# Patient Record
Sex: FEMALE | Race: WHITE | Employment: UNEMPLOYED | URBAN - METROPOLITAN AREA
[De-identification: names, ages, dates, MRNs, and addresses within clinical notes are randomized per-mention and may not be internally consistent; named-entity substitution may affect disease eponyms.]

---

## 2017-03-15 ENCOUNTER — OFFICE VISIT (OUTPATIENT)
Dept: ORTHOPEDIC SURGERY | Age: 52
End: 2017-03-15

## 2017-03-15 ENCOUNTER — HOSPITAL ENCOUNTER (OUTPATIENT)
Dept: PHYSICAL THERAPY | Age: 52
Discharge: HOME | End: 2017-03-31
Admitting: ORTHOPAEDIC SURGERY

## 2017-03-15 VITALS — WEIGHT: 164.9 LBS | HEIGHT: 67 IN | BODY MASS INDEX: 25.88 KG/M2

## 2017-03-15 DIAGNOSIS — R90.89 ABNORMAL BRAIN MRI: ICD-10-CM

## 2017-03-15 DIAGNOSIS — M54.12 RADICULITIS OF LEFT CERVICAL REGION: ICD-10-CM

## 2017-03-15 DIAGNOSIS — G44.86 CERVICOGENIC HEADACHE: ICD-10-CM

## 2017-03-15 DIAGNOSIS — M54.2 CERVICAL SPINE PAIN: Primary | ICD-10-CM

## 2017-03-15 DIAGNOSIS — M50.30 DDD (DEGENERATIVE DISC DISEASE), CERVICAL: ICD-10-CM

## 2017-03-15 DIAGNOSIS — M50.20 CERVICAL DISCOGENIC PAIN SYNDROME: Primary | ICD-10-CM

## 2017-03-15 DIAGNOSIS — M54.2 NECK PAIN: ICD-10-CM

## 2017-03-15 DIAGNOSIS — S16.1XXA CERVICAL STRAIN, ACUTE, INITIAL ENCOUNTER: ICD-10-CM

## 2017-03-15 DIAGNOSIS — H81.90 VESTIBULAR DIZZINESS, UNSPECIFIED LATERALITY: ICD-10-CM

## 2017-03-15 DIAGNOSIS — H53.9 VISUAL DISTURBANCE: ICD-10-CM

## 2017-03-15 PROCEDURE — 99204 OFFICE O/P NEW MOD 45 MIN: CPT | Performed by: INTERNAL MEDICINE

## 2017-03-15 PROCEDURE — 72040 X-RAY EXAM NECK SPINE 2-3 VW: CPT | Performed by: INTERNAL MEDICINE

## 2017-03-15 RX ORDER — PREDNISONE 50 MG/1
50 TABLET ORAL DAILY
Qty: 6 TABLET | Refills: 0 | Status: SHIPPED | OUTPATIENT
Start: 2017-03-15 | End: 2017-03-21

## 2017-03-15 RX ORDER — TIZANIDINE 4 MG/1
4 TABLET ORAL 3 TIMES DAILY PRN
Qty: 30 TABLET | Refills: 1 | Status: SHIPPED | OUTPATIENT
Start: 2017-03-15 | End: 2020-10-06 | Stop reason: ALTCHOICE

## 2017-03-15 RX ORDER — ESTRADIOL 0.03 MG/D
1 FILM, EXTENDED RELEASE TRANSDERMAL
COMMUNITY
End: 2020-10-06 | Stop reason: ALTCHOICE

## 2017-03-15 RX ORDER — ONDANSETRON 4 MG/1
4 TABLET, FILM COATED ORAL EVERY 8 HOURS PRN
Qty: 20 TABLET | Refills: 1 | Status: SHIPPED | OUTPATIENT
Start: 2017-03-15 | End: 2020-10-06

## 2017-06-01 ENCOUNTER — OFFICE VISIT (OUTPATIENT)
Dept: ORTHOPEDIC SURGERY | Age: 52
End: 2017-06-01

## 2017-06-01 VITALS — WEIGHT: 165 LBS | HEIGHT: 67 IN | BODY MASS INDEX: 25.9 KG/M2

## 2017-06-01 DIAGNOSIS — M79.671 RIGHT FOOT PAIN: Primary | ICD-10-CM

## 2017-06-01 PROCEDURE — 99999 XR FOOT RIGHT STANDARD: CPT | Performed by: ORTHOPAEDIC SURGERY

## 2017-06-01 RX ORDER — AZITHROMYCIN 500 MG/1
TABLET, FILM COATED ORAL
COMMUNITY
Start: 2017-03-29 | End: 2017-07-03 | Stop reason: ALTCHOICE

## 2017-06-01 RX ORDER — INDOMETHACIN 50 MG/1
CAPSULE ORAL
COMMUNITY
Start: 2017-04-12 | End: 2020-10-06 | Stop reason: ALTCHOICE

## 2017-06-01 RX ORDER — MELOXICAM 15 MG/1
15 TABLET ORAL DAILY
Qty: 30 TABLET | Refills: 3 | Status: SHIPPED | OUTPATIENT
Start: 2017-06-01 | End: 2017-07-03 | Stop reason: ALTCHOICE

## 2017-06-01 RX ORDER — ZOLPIDEM TARTRATE 10 MG/1
TABLET, FILM COATED ORAL
COMMUNITY
Start: 2017-04-27

## 2017-06-01 RX ORDER — DEXTROAMPHETAMINE SACCHARATE, AMPHETAMINE ASPARTATE, DEXTROAMPHETAMINE SULFATE AND AMPHETAMINE SULFATE 1.25; 1.25; 1.25; 1.25 MG/1; MG/1; MG/1; MG/1
TABLET ORAL
COMMUNITY
Start: 2017-04-27 | End: 2020-10-06 | Stop reason: ALTCHOICE

## 2017-06-01 RX ORDER — ESTRADIOL 0.07 MG/D
FILM, EXTENDED RELEASE TRANSDERMAL
COMMUNITY
Start: 2017-05-16 | End: 2020-10-06 | Stop reason: ALTCHOICE

## 2017-06-01 RX ORDER — LORCASERIN HYDROCHLORIDE HEMIHYDRATE 10 MG/1
TABLET ORAL
COMMUNITY
Start: 2017-04-27 | End: 2020-10-06 | Stop reason: ALTCHOICE

## 2017-06-01 RX ORDER — LINACLOTIDE 290 UG/1
CAPSULE, GELATIN COATED ORAL
COMMUNITY
Start: 2017-05-17 | End: 2020-10-06

## 2017-06-01 RX ORDER — PREDNISONE 50 MG/1
TABLET ORAL
COMMUNITY
Start: 2017-03-15 | End: 2020-10-06 | Stop reason: ALTCHOICE

## 2017-06-01 RX ORDER — FLIBANSERIN 100 MG/1
TABLET, FILM COATED ORAL
COMMUNITY
Start: 2017-02-26 | End: 2017-07-03 | Stop reason: ALTCHOICE

## 2017-07-03 ENCOUNTER — OFFICE VISIT (OUTPATIENT)
Dept: DERMATOLOGY | Age: 52
End: 2017-07-03

## 2017-07-03 DIAGNOSIS — D22.9 MULTIPLE NEVI: ICD-10-CM

## 2017-07-03 DIAGNOSIS — L70.0 ACNE VULGARIS: ICD-10-CM

## 2017-07-03 DIAGNOSIS — Z87.2 HISTORY OF ACTINIC KERATOSES: Primary | ICD-10-CM

## 2017-07-03 DIAGNOSIS — D23.9 DERMATOFIBROMA: ICD-10-CM

## 2017-07-03 PROCEDURE — 99214 OFFICE O/P EST MOD 30 MIN: CPT | Performed by: DERMATOLOGY

## 2017-07-03 RX ORDER — CLINDAMYCIN PHOSPHATE 10 MG/ML
SOLUTION TOPICAL
Qty: 60 EACH | Refills: 4 | Status: SHIPPED | OUTPATIENT
Start: 2017-07-03 | End: 2020-10-06 | Stop reason: ALTCHOICE

## 2017-09-06 ENCOUNTER — HOSPITAL ENCOUNTER (OUTPATIENT)
Dept: MAMMOGRAPHY | Age: 52
Discharge: HOME | End: 2017-09-06
Attending: INTERNAL MEDICINE | Admitting: INTERNAL MEDICINE

## 2017-09-06 DIAGNOSIS — Z12.31 VISIT FOR SCREENING MAMMOGRAM: ICD-10-CM

## 2017-10-30 ENCOUNTER — TELEPHONE (OUTPATIENT)
Dept: DERMATOLOGY | Age: 52
End: 2017-10-30

## 2017-10-30 NOTE — TELEPHONE ENCOUNTER
Debra Li called about her son who has not seen Dr. Yari Rodriguez but was recommended by her to see a doctor in [facility]. Debra Li is wondering if Dr. Yari Rodriguez remembers who that doctor was? It was a male doctor in the [facility] area.

## 2018-09-07 ENCOUNTER — TELEPHONE (OUTPATIENT)
Dept: DERMATOLOGY | Age: 53
End: 2018-09-07

## 2018-09-10 NOTE — TELEPHONE ENCOUNTER
Where did she have the biopsy done? Location on her body and doctor who did it? What was there recommendation? Let's find out these details and then see if I'm the right person to see or if I need to refer her to someone else.

## 2018-09-10 NOTE — TELEPHONE ENCOUNTER
Pt lmvm  Pt c/b [phone]   Pt states:   - calling about getting her daughter myron  Please call back to discuss thanks

## 2018-10-25 ENCOUNTER — TELEPHONE (OUTPATIENT)
Dept: DERMATOLOGY | Age: 53
End: 2018-10-25

## 2018-10-29 ENCOUNTER — HOSPITAL ENCOUNTER (OUTPATIENT)
Dept: MAMMOGRAPHY | Age: 53
Discharge: HOME | End: 2018-10-29
Payer: COMMERCIAL

## 2018-10-29 DIAGNOSIS — Z12.31 VISIT FOR SCREENING MAMMOGRAM: ICD-10-CM

## 2018-10-29 PROCEDURE — 77063 BREAST TOMOSYNTHESIS BI: CPT

## 2018-11-05 ENCOUNTER — TELEPHONE (OUTPATIENT)
Dept: DERMATOLOGY | Age: 53
End: 2018-11-05

## 2019-02-25 ENCOUNTER — OFFICE VISIT (OUTPATIENT)
Dept: DERMATOLOGY | Age: 54
End: 2019-02-25
Payer: COMMERCIAL

## 2019-02-25 DIAGNOSIS — D23.9 DERMATOFIBROMA: ICD-10-CM

## 2019-02-25 DIAGNOSIS — D22.9 MULTIPLE NEVI: ICD-10-CM

## 2019-02-25 DIAGNOSIS — Z87.2 HISTORY OF ACTINIC KERATOSES: Primary | ICD-10-CM

## 2019-02-25 DIAGNOSIS — D48.5 NEOPLASM OF UNCERTAIN BEHAVIOR OF SKIN: ICD-10-CM

## 2019-02-25 DIAGNOSIS — L70.0 ACNE VULGARIS: ICD-10-CM

## 2019-02-25 PROCEDURE — 99214 OFFICE O/P EST MOD 30 MIN: CPT | Performed by: DERMATOLOGY

## 2019-04-01 ENCOUNTER — OFFICE VISIT (OUTPATIENT)
Dept: DERMATOLOGY | Age: 54
End: 2019-04-01
Payer: COMMERCIAL

## 2019-04-01 DIAGNOSIS — R52 PAIN: ICD-10-CM

## 2019-04-01 DIAGNOSIS — D23.9 DERMATOFIBROMA: Primary | ICD-10-CM

## 2019-04-01 PROCEDURE — 11400 EXC TR-EXT B9+MARG 0.5 CM<: CPT | Performed by: DERMATOLOGY

## 2019-04-01 PROCEDURE — 99999 PR OFFICE/OUTPT VISIT,PROCEDURE ONLY: CPT | Performed by: DERMATOLOGY

## 2019-04-01 NOTE — PROGRESS NOTES
[facility]  Jt Begum MD  [phone]      Delon  1965    46 y.o. female     Date of Visit: 4/1/2019    Chief Complaint: lesions - here for bx  Chief Complaint   Patient presents with    Skin Lesion     Last seen: 2-2019  *[place] [facility] [place]    History of Present Illness:    1. Here for removal of a persistent papule on the L arm. Is more bothersome/occasionally tender in recent mos. 2. She had a concerning pink lesion on the R thigh noted 2 mos ago - has faded since last seen. Asx. She has had botox and  the past.    Other Hx: HA's x several years. Her HA's resolved completely when she received botox for cosmetic reasons in the past. She reports that she has previously tried imitrex and OTC pain relievers for her HA's in the past without sustained improvement. Dermatology History   Rosacea  has had VI Peels, botox    Several year hx of waxing/waning pruritic eruption involving shoulders/UE and buttocks in the past; diagnosed with gluten sensitivity and has been following gluten-free diet (though occasionally goes off diet); no flares with sun exposure; washes with cetaphil; uses la juana on face and variable moisturizers on remainder of skin. Pt. has been using ice applied to the affected areas to calm the itching. She had a bx done in the past by Dr. Tereasa Crigler bx'd in the past and reports that Dr. Tereasa Crigler was concerned about lupus but further w/u was negative. Has not recurred since following gluten-free. Review of Systems:  Gen: Feels well, good sense of health. Skin: no other concerning moles or concerning lesions    Past Medical History, Family History, Surgical History, Medications and Allergies reviewed.     Past Medical History:   Diagnosis Date    Acne     Anxiety     Constipation     Gluten intolerance     Hearing loss     Hormone replacement therapy     Insomnia     can't confirm re sleep apnea    Menopausal pink and barely visible macule    Assessment and Plan     1. DF on the L upper arm - irritated  - excision to subcut performed with 5 mm instrument after verbal consent obtained. Patient educated regarding risk of bleeding, infection, scar and educated on wound care. Skin cleansed with alcohol pad and site anesthetized with lido + epi. Aluminum chloride applied to site for hemostasis if necessary and sutures placed. Petrolatum ointment and bandage applied. Specimen bottle labeled with patient information and site and specimen sent to dermpath. 2. R thigh - c/w LK - faded and nearly resolved since last seen    F/u 12 mos for FSE    She previously was planning to f/u with Dr. Roxane Do for filler - she wanted her lips treated and wanted a nerve block but hasn't had anything done recently.

## 2019-04-03 LAB — DERMATOLOGY PATHOLOGY REPORT: NORMAL

## 2019-04-04 ENCOUNTER — OFFICE VISIT (OUTPATIENT)
Dept: ORTHOPEDIC SURGERY | Age: 54
End: 2019-04-04
Payer: COMMERCIAL

## 2019-04-04 VITALS — WEIGHT: 165 LBS | HEIGHT: 67 IN | BODY MASS INDEX: 25.9 KG/M2

## 2019-04-04 DIAGNOSIS — M25.551 RIGHT HIP PAIN: Primary | ICD-10-CM

## 2019-04-04 PROCEDURE — 20610 DRAIN/INJ JOINT/BURSA W/O US: CPT | Performed by: ORTHOPAEDIC SURGERY

## 2019-04-04 PROCEDURE — 99999 PR OFFICE/OUTPT VISIT,PROCEDURE ONLY: CPT | Performed by: ORTHOPAEDIC SURGERY

## 2019-04-04 NOTE — PROGRESS NOTES
History of Present Illness:  Enid Aguillon is a 46 y.o. female being seen today for the 1st time for right lateral thigh pain and lumbar pain she gets some radiculopathy to mid thigh but most of it is low back and gluteal    Chief complaint that brought the patient in the office today: Right hip pain and low back pain      Location right hip and low back  Severity severe  Duration several weeks  Associated sign/symptoms pain, swelling, loss of motion, trouble sleeping,    I have reviewed and discussed the below Pain assessment findings with the patient. Pain Assessment  Location of Pain: Pelvis  Location Modifiers: Right  Severity of Pain: 6  Quality of Pain: Throbbing, Sharp  Duration of Pain: Persistent  Aggravating Factors: Squatting, Exercise, Walking, Standing, Stairs  Limiting Behavior: Yes  Relieving Factors: Rest  Result of Injury: No  Work-Related Injury: No  Are there other pain locations you wish to document?: No    Medical History  Patient's medications, allergies, past medical, surgical, social and family histories were reviewed and updated as appropriate. Past Medical History:   Diagnosis Date    Acne     Anxiety     Constipation     Gluten intolerance     Hearing loss     Hormone replacement therapy     Insomnia     can't confirm re sleep apnea    Menopausal syndrome on hormone replacement therapy     Migraine headache     Nausea & vomiting     itching    Ovarian cyst, right     Overweight (BMI 25.0-29. 9)     Pes cavus     Piriformis syndrome     Pruritus     Thyroid disease     nodule    Vitamin D deficiency     started replacement     Family History   Problem Relation Age of Onset    Cancer Mother         lung    Arthritis Mother     Stroke Father     COPD Father      Social History     Socioeconomic History    Marital status:      Spouse name: None    Number of children: None    Years of education: None    Highest education level: None   Occupational History    None   Social Needs    Financial resource strain: None    Food insecurity:     Worry: None     Inability: None    Transportation needs:     Medical: None     Non-medical: None   Tobacco Use    Smoking status: Former Smoker     Last attempt to quit: 1/15/2011     Years since quittin.2    Smokeless tobacco: Never Used   Substance and Sexual Activity    Alcohol use: Yes     Comment: 4 glasses of wine a week    Drug use: No    Sexual activity: None   Lifestyle    Physical activity:     Days per week: None     Minutes per session: None    Stress: None   Relationships    Social connections:     Talks on phone: None     Gets together: None     Attends [employer] service: None     Active member of club or organization: None     Attends meetings of clubs or organizations: None     Relationship status: None    Intimate partner violence:     Fear of current or ex partner: None     Emotionally abused: None     Physically abused: None     Forced sexual activity: None   Other Topics Concern    None   Social History Narrative    None     Current Outpatient Medications   Medication Sig Dispense Refill    tretinoin (RETIN-A) 0.025 % cream APPLY TOPICALLY TO FACE AT BEDTIME 45 g 3    CLINDAMYCIN PHOSPHATE,TOPICAL, (CLINDACIN ETZ) 1 % SWAB Disp: 1 box or 60 pledgets. Wipe the affected area BID 60 each 4    amphetamine-dextroamphetamine (ADDERALL) 5 MG tablet .       MINIVELLE 0.075 MG/24HR       indomethacin (INDOCIN) 50 MG capsule       LINZESS 290 MCG CAPS capsule       BELVIQ 10 MG TABS       predniSONE (DELTASONE) 50 MG tablet       AMBIEN 10 MG tablet       estradiol 0.025 MG/24HR PTTW Place 1 patch onto the skin Twice a Week      tiZANidine (ZANAFLEX) 4 MG tablet Take 1 tablet by mouth 3 times daily as needed (Muscle tightness/spasm) 30 tablet 1    ondansetron (ZOFRAN) 4 MG tablet Take 1 tablet by mouth every 8 hours as needed for Nausea or Vomiting 20 tablet 1     No current facility-administered medications for this visit. Allergies   Allergen Reactions    Pcn [Penicillins] Hives    Gluten Meal Other (See Comments)     bloating       REVIEW OF SYSTEMS:   No rashes today  No new numbness  No tingling  No fevers  No depression  No new onset of pain        Pertinent items are noted in HPI  Review of systems reviewed from Patient History Form dated on 4/4/19 and available in the patient's chart under the Media tab. Examination:    General Exam:    Vitals: Height 5' 7\" (1.702 m), weight 165 lb (74.8 kg). Constitutional: Patient is adequately groomed with no evidence of malnutrition  Mental Status: The patient is oriented to time, place and person. The patient's mood and affect are appropriate. Lymphatic: The lymphatic examination bilaterally reveals all areas to be without enlargement or induration. Vascular: Examination reveals no swelling or calf tenderness. Peripheral pulses are palpable and 2+. Neurological: The patient has good coordination. There is no weakness or sensory deficit. Skin:    Head/Neck: inspection reveals no rashes, ulcerations or lesions. Trunk:  inspection reveals no rashes, ulcerations or lesions. Right Lower Extremity: inspection reveals no rashes, ulcerations or lesions. Left Lower Extremity: inspection reveals no rashes, ulcerations or lesions.       Lumbar/Sacral Spine Examination  Inspection:  Inspection demonstrates no obvious deformity    Palpation:  Severe low back tightness and pain with severe right greater trochanteric bursitis    Rang of Motion:  Good range of motion of her hip or range of motion of her lumbar spine    Strength:  Quadricep, hamstring, EHL, hip flexor, internal and external rotation of the hip against resistance, all 5 over 5 equal bilaterally    Special Tests:  Pedal pulses are +2 over 4 capillary refill is brisk sensation is intact negative Homans negative Lázaro negative Clifford's test negative straight leg raise provocative testing of his hip is unremarkable severe pain to palpation at the greater trochanteric area provocative testing of the hip is unremarkable    Skin: There are no rashes, ulcerations or lesions. Gait: Antalgic  +2/4 and equal bilaterally for patella and Achilles      Additional Comments:     Additional Examinations:  Right Upper Extremity:  Examination of the right upper extremity does not show any tenderness, deformity or injury. Range of motion is unremarkable. There is no gross instability. There are no rashes, ulcerations or lesions. Strength and tone are normal.  Left Upper Extremity: Examination of the left upper extremity does not show any tenderness, deformity or injury. Range of motion is unremarkable. There is no gross instability. There are no rashes, ulcerations or lesions. Strength and tone are normal.  Thoracic Spine: Examination of the thoracic spine does not show any tenderness, deformity or injury. Range of motion is unremarkable. There is no gross instability. There are no rashes, ulcerations or lesions. Strength and tone are normal.  Neck: Examination of the neck does not show any tenderness, deformity or injury. Range of motion is unremarkable. There is no gross instability. There are no rashes, ulcerations or lesions.   Strength and tone are normal.      Diagnostic Testing:    Views AP pelvis and AP and lateral lumbar spine  and I independently reviewed these films today in my office,   Body Part no fracture no dislocation no signs of severe osteoarthritis she does have a slight scoliosis of the lumbar spine and she has an plate irregularity with some early arthritic changes at L2-L3 Impression mild early arthritic changes of the lumbar spine          Assessment:  Lumbar radiculitis radiculopathy with right thigh greater trochanteric bursitis    Impression: Same    Office Procedures: Under sterile conditions with alcohol

## 2019-04-11 ENCOUNTER — OFFICE VISIT (OUTPATIENT)
Dept: ORTHOPEDIC SURGERY | Age: 54
End: 2019-04-11

## 2019-04-11 VITALS — BODY MASS INDEX: 25.88 KG/M2 | HEIGHT: 67 IN | WEIGHT: 164.9 LBS

## 2019-04-11 DIAGNOSIS — M54.5 ACUTE RIGHT-SIDED LOW BACK PAIN, WITH SCIATICA PRESENCE UNSPECIFIED: Primary | ICD-10-CM

## 2019-04-11 PROCEDURE — 99024 POSTOP FOLLOW-UP VISIT: CPT | Performed by: ORTHOPAEDIC SURGERY

## 2019-04-11 RX ORDER — MELOXICAM 15 MG/1
15 TABLET ORAL DAILY
Qty: 90 TABLET | Refills: 3 | Status: SHIPPED | OUTPATIENT
Start: 2019-04-11 | End: 2020-10-06 | Stop reason: ALTCHOICE

## 2019-04-11 NOTE — PROGRESS NOTES
Relationships    Social connections:     Talks on phone: None     Gets together: None     Attends [employer] service: None     Active member of club or organization: None     Attends meetings of clubs or organizations: None     Relationship status: None    Intimate partner violence:     Fear of current or ex partner: None     Emotionally abused: None     Physically abused: None     Forced sexual activity: None   Other Topics Concern    None   Social History Narrative    None     Current Outpatient Medications   Medication Sig Dispense Refill    tretinoin (RETIN-A) 0.025 % cream APPLY TOPICALLY TO FACE AT BEDTIME 45 g 3    CLINDAMYCIN PHOSPHATE,TOPICAL, (CLINDACIN ETZ) 1 % SWAB Disp: 1 box or 60 pledgets. Wipe the affected area BID 60 each 4    amphetamine-dextroamphetamine (ADDERALL) 5 MG tablet .  MINIVELLE 0.075 MG/24HR       indomethacin (INDOCIN) 50 MG capsule       LINZESS 290 MCG CAPS capsule       BELVIQ 10 MG TABS       predniSONE (DELTASONE) 50 MG tablet       AMBIEN 10 MG tablet       estradiol 0.025 MG/24HR PTTW Place 1 patch onto the skin Twice a Week      tiZANidine (ZANAFLEX) 4 MG tablet Take 1 tablet by mouth 3 times daily as needed (Muscle tightness/spasm) 30 tablet 1    ondansetron (ZOFRAN) 4 MG tablet Take 1 tablet by mouth every 8 hours as needed for Nausea or Vomiting 20 tablet 1     No current facility-administered medications for this visit. Allergies   Allergen Reactions    Pcn [Penicillins] Hives    Gluten Meal Other (See Comments)     bloating       REVIEW OF SYSTEMS:   Pertinent items are noted in HPI  Review of systems reviewed from Patient History Form dated on 4/11/19 and available in the patient's chart under the Media tab. Examination:    General Exam:    Vitals: Height 5' 7.01\" (1.702 m), weight 164 lb 14.5 oz (74.8 kg).   Constitutional: Patient is adequately groomed with no evidence of malnutrition  Mental Status: The patient is oriented to time, place and person. The patient's mood and affect are appropriate. Lumbar/Sacral Spine Examination  Inspection:  Inspection demonstrates no obvious deformity    Palpation:  Moderate muscle spasm and pain along the lumbar spine    Rang of Motion:  Decreased range of motion of the lumbar spine    Strength:  Quadricep, hamstring, EHL, hip flexor, internal and external rotation of the hip against resistance, all 5 over 5 equal bilaterally    Special Tests:  Pedal pulses are +2 over 4 capillary refill is brisk sensation is intact negative Homans negative Lázaro negative Clifford's test straight-leg raise on the right is positive moderate pain to palpation along the lumbar spine    Gait: Antalgic    Additional Comments:     Additional Examinations:  Right Upper Extremity:  Examination of the right upper extremity does not show any tenderness, deformity or injury. Range of motion is unremarkable. There is no gross instability. There are no rashes, ulcerations or lesions. Strength and tone are normal.  Left Upper Extremity: Examination of the left upper extremity does not show any tenderness, deformity or injury. Range of motion is unremarkable. There is no gross instability. There are no rashes, ulcerations or lesions. Strength and tone are normal.  Thoracic Spine: Examination of the thoracic spine does not show any tenderness, deformity or injury. Range of motion is unremarkable. There is no gross instability. There are no rashes, ulcerations or lesions.   Strength and tone are normal.      Diagnostic Testing:    Views MRI multiple views taken in the office today  Body Part lumbar spine Impression several areas of moderate-to-severe osteoarthritis with joint space narrowing and nerve impingement especially L4-L5 and L5-S1 rotary scoliosis and straightening of the lumbar curvature          Assessment:  Lumbar disc disease with osteoarthritis and radiculopathy    Impression: Same    Office Procedures:  Orders Placed This Encounter   Procedures    Ambulatory referral to Physical Therapy     Referral Priority:   Routine     Referral Type:   Eval and Treat     Referral Reason:   Specialty Services Required     Requested Specialty:   Physical Therapy     Number of Visits Requested:   1       Treatment Plan:  Physical therapy, possible epidural injections, meloxicam, follow-up in a few weeks         Rosalie Gillespie DO

## 2019-04-25 ENCOUNTER — HOSPITAL ENCOUNTER (OUTPATIENT)
Dept: PHYSICAL THERAPY | Age: 54
Setting detail: THERAPIES SERIES
Discharge: HOME | End: 2019-04-25
Payer: COMMERCIAL

## 2019-04-25 PROCEDURE — 97140 MANUAL THERAPY 1/> REGIONS: CPT

## 2019-04-25 PROCEDURE — 97110 THERAPEUTIC EXERCISES: CPT

## 2019-04-25 PROCEDURE — G0283 ELEC STIM OTHER THAN WOUND: HCPCS

## 2019-04-25 PROCEDURE — 97162 PT EVAL MOD COMPLEX 30 MIN: CPT

## 2019-04-25 NOTE — PLAN OF CARE
The [address] [place] [address] and [address]      Physical Therapy Certification    Dear Referring Practitioner: Lance Arevalo,    We had the pleasure of evaluating the following patient for physical therapy services at [address] [place] [address]. A summary of our findings can be found in the initial assessment below. This includes our plan of care. If you have any questions or concerns regarding these findings, please do not hesitate to contact me at the office phone number checked above. Thank you for the referral.       Physician Signature:_______________________________Date:__________________  By signing above (or electronic signature), therapists plan is approved by physician      Patient: Antione Pinedo   : 1965   MRN: [id number]  Referring Physician: Referring Practitioner: Lance Arevalo      Evaluation Date: 2019      Medical Diagnosis Information:  Diagnosis: M54.5 (ICD-10-CM) - Acute right-sided low back pain, with sciatica presence unspecified   Treatment Diagnosis: Decreased lumbar ROM, decreased lumbar and hip flexibility, decreased BLE strength, increased pain                                         Insurance information: PT Insurance Information: PT BENEFITS 2019 FACILITY/[employer]/ EFFECTIVE 16/ ACTIVE/DED 6800  MET 6132.92/ PAYS 100%/ OOP 6800 MET 6132.92/ 25 VPCY/ 0 AUTH/ RAIN REF# [id number] 19 PAG     Precautions/ Contra-indications: OA, Anxiety  Latex Allergy:  [x]NO      []YES  Preferred Language for Healthcare:   [x]English       []other:    SUBJECTIVE: Patient stated complaint: 46year old female presents to PT with complaints of low back pain and lateral right hip pain. She has had back pain for years (denies any ODALYS) that she feels is worsened after busier days if she has been doing physical work. Her back pain was intensified after going on vacation and included pain in her right glute.  She reports that she had recently gained weight so she tried getting back into working out [employer]) and had pain with things like sit ups and bridges. Today, she has pinpoint pain in her right glute and a broad spectrum of pain across her lumbar spine. She describes it as \"uncomfortable\". Denies radiating pain, denies numbness/tingling. Does feel her balance is slightly off. No changes in bowel / bladder function. No changes in sensation. No unusual weight loss. Pain at night due to difficulty \" getting comfortable\".      Relevant Medical History: Dystonia in left foot (she receives injections from neurologist)  Functional Disability Index/G-Codes:  PT G-Codes  Functional Assessment Tool Used: PATIENT DID NOT COMPLETE ON INITIAL EVAL - WILL ADD AT NPV    Pain Scale: 4/10 at rest, 7-8/10 at worst  Easing factors: Floating in water tank, heat, Meloxicam (at night)  Provocative factors: performing sit-up, yard work / gardening,  Lying down (semi reclined) at night    Type: []Constant   []Intermittent  []Radiating []Localized []other:     Numbness/Tingling: Denies    Occupation/School:  Currently building a home so busy with with meetings and packing boxes    Living Status/Prior Level of Function: Independent with ADLs and IADLs; hopes to return to [employer]; [employer] in the winter time    OBJECTIVE:   ROM  Comments   Trunk flexion WNL Reports \"tightness\" and \"sore\"   Trunk extension 50% Limited Painful at L5/S1   Trunk R sidebend To joint line of knee Mild discomfort in right gluteal area   Trunk L sidebend To joint line of knee    Trunk R rotation     Trunk L rotation          HS flexibility WNL bilaterally    Quad Flexibility  Limited L>R    Piriformis Limited R>L    ITB Mild limitation Bilat      Strength Left Right Comments   Hip flexion(L2) 4+ 4+    Knee extension(L3) 5 5    Knee flexion(S1-2) 5 5    Ankle dorsiflexion(L4) 5 5    Toe extension(L5) 5 5    Ankle eversion/plantar flexion(S1) NT NT          Hip Abd sleep   [] Reduced ability or tolerance with driving and/or computer work   [x]Reduced ability to perform lifting, reaching, carrying tasks   [x]Reduced ability to squat   [x]Reduced ability to forward bend   [x]Reduced ability to ambulate prolonged functional periods/distances/surfaces   [x]Reduced ability to ascend/descend stairs   []other:       Participation Restrictions   [x]Reduced participation in self care activities   [x]Reduced participation in home management activities   []Reduced participation in work activities   [x]Reduced participation in social activities. []Reduced participation in sport/recreational activities. Classification:   []Signs/symptoms consistent with Lumbar instability/stabilization subgroup. []Signs/symptoms consistent with Lumbar mobilization/manipulation subgroup, myotomes and dermatomes intact. Meets manipulation criteria. []Signs/symptoms consistent with Lumbar direction specific/centralization subgroup   []Signs/symptoms consistent with Lumbar traction subgroup     []Signs/symptoms consistent with lumbar facet dysfunction   [x]Signs/symptoms consistent with lumbar stenosis type dysfunction   []Signs/symptoms consistent with nerve root involvement including myotome & dermatome dysfunction   []Signs/symptoms consistent with post-surgical status including: decreased ROM, strength and function.    [x]signs/symptoms consistent with pathology which may benefit from Dry needling     []other:      Prognosis/Rehab Potential:      []Excellent   [x]Good    []Fair   []Poor    Tolerance of evaluation/treatment:    []Excellent   [x]Good    []Fair   []Poor    Physical Therapy Evaluation Complexity Justification  [x] A history of present problem with:  [] no personal factors and/or comorbidities that impact the plan of care;  [x]1-2 personal factors and/or comorbidities that impact the plan of care  []3 personal factors and/or comorbidities that impact the plan of care  [x] An for Patient:   Short Term Goals: To be achieved in: 2 weeks  1. Independent in HEP and progression per patient tolerance, in order to prevent re-injury. 2. Patient will have a decrease in pain to facilitate improvement in movement, function, and ADLs as indicated by Functional Deficits. Long Term Goals: To be achieved in: 8 weeks  1. Disability index score of 10% or less for the [place] to assist with reaching prior level of function. 2. Patient will demonstrate increased AROM to WNL, good LS mobility, good hip ROM to allow for proper joint functioning as indicated by patients Functional Deficits. 3. Patient will demonstrate an increase in Strength to good proximal hip and core activation to allow for proper functional mobility as indicated by patients Functional Deficits. 4. Patient will return to pilates classes without increased symptoms or restriction.    5. Patient will be able to tolerate yard work and gardening without an increase in her low back or hip pain. (patient specific functional goal)       Electronically signed by:  Jonny Mcmahon, PT, DPT

## 2019-04-26 NOTE — FLOWSHEET NOTE
eversion/plantar flexion(S1) NT NT               Hip Abd 4- 3+     Hip Ext 4+ 4-        Special tests   Comments   SLR Neg     Slump test Mild BIlaterally     Pelvic symmetry  Neg     Segmental Spinal mobility Mild hypo throughout Tenderness noted by patient throughout lumbar spine   Heel walk       Toe walk       Tandem walk       CODI Neg bilat Slight tightness on R   FADDIR Neg bilat Feels stretch bilaterally              DTRs Left Right Comments   Patellar(L3-L4) NT NT     Achilles(S1-S2) NT NT                  Joint mobility:               []Normal               [x]Hypo: slight restriction in lumbar spine with PA mobs              []Hyper     Palpation: moderate tenderness throughout piriformis and at insertion point on greater trochanter on R side; moderate tenderness in R lumbar paraspinals, mild on L lumbar paraspinals     Functional Mobility/Transfers: Ind     Posture: Slight increased lordosis in lumbar spine at rest     Bandages/Dressings/Incisions: n/a     Gait: (include devices/WB status) mild trendelenburg pattern      Orthopedic Special Tests: see above        RESTRICTIONS/PRECAUTIONS: OA lumbar spine / DDD;  Anxiety    Exercises/Interventions:   ROM/stretches     SKTC NPV    DKTC 3 x 30\"    Prayer stretch     Supine HS     Pelvic tilt     Hook lying rotation 10 x 10\"    Cat and camel     Piriformis 3 x 30\" each Figure 4 - pull        Strengthening     PPT NPV    PPT + TrA NPV              SLR     Quadruped alternate UE reaches     Quadruped alternate LE reaches     Quadruped alternate UE/LE reaches     Humouno heel raises     Sit ups      planks     Tband lat pulls     Tband rows         Manual Intervention             Prone PA      GISTM/STM 8'  Lumbar paraspinals   Lumbar Manip      SI Manip      Hip belt mobs      Hip LA distraction              Therapeutic Exercise and NMR EXR  [x] (83299) Provided verbal/tactile cueing for activities related to strengthening, flexibility, endurance, ROM  for improvements in proximal hip and core control with self care, mobility, lifting and ambulation.  [] (75608) Provided verbal/tactile cueing for activities related to improving balance, coordination, kinesthetic sense, posture, motor skill, proprioception  to assist with core control in self care, mobility, lifting, and ambulation. Therapeutic Activities:    [x] (17496 or 52760) Provided verbal/tactile cueing for activities related to improving balance, coordination, kinesthetic sense, posture, motor skill, proprioception and motor activation to allow for proper function  with self care and ADLs  [] (82747) Provided training and instruction to the patient for proper core and proximal hip recruitment and positioning with ambulation re-education     Home Exercise Program:    [x] (66990) Reviewed/Progressed HEP activities related to strengthening, flexibility, endurance, ROM of core, proximal hip and LE for functional self-care, mobility, lifting and ambulation   [] (25276) Reviewed/Progressed HEP activities related to improving balance, coordination, kinesthetic sense, posture, motor skill, proprioception of core, proximal hip and LE for self care, mobility, lifting, and ambulation      Manual Treatments:  PROM / STM / Oscillations-Mobs:  G-I, II, III, IV (PA's, Inf., Post.)  [x] (48553) Provided manual therapy to mobilize proximal hip and LS spine soft tissue/joints for the purpose of modulating pain, promoting relaxation,  increasing ROM, reducing/eliminating soft tissue swelling/inflammation/restriction, improving soft tissue extensibility and allowing for proper ROM for normal function with self care, mobility, lifting and ambulation.      Modalities:   Hot pack + IFC x 15'    Charges:  Timed Code Treatment Minutes: 25'   Total Treatment Minutes: 72'       [] EVAL (LOW) 97986 (typically 20 minutes face-to-face)  [x] EVAL (MOD) 70513 (typically 30 minutes face-to-face)  [] EVAL (HIGH) 77034 (typically 45 minutes face-to-face)  [] RE-EVAL     [x] WZ(71191) x  1   [] IONTO  [] NMR (47917) x      [] VASO  [x] Manual (53632) x  1    [] Other:  [] TA x       [] Mech Traction (91317)  [] ES(attended) (62714)      [x] ES (un) (61357):     GOALS:  Patient stated goal: Return to pilates and fitness activities    Therapist goals for Patient:   Short Term Goals: To be achieved in: 2 weeks  1. Independent in HEP and progression per patient tolerance, in order to prevent re-injury. 2. Patient will have a decrease in pain to facilitate improvement in movement, function, and ADLs as indicated by Functional Deficits. Long Term Goals: To be achieved in: 8 weeks  1. Disability index score of 10% or less for the José Antoniostan to assist with reaching prior level of function. 2. Patient will demonstrate increased AROM to WNL, good LS mobility, good hip ROM to allow for proper joint functioning as indicated by patients Functional Deficits. 3. Patient will demonstrate an increase in Strength to good proximal hip and core activation to allow for proper functional mobility as indicated by patients Functional Deficits. 4. Patient will return to pilates classes without increased symptoms or restriction. 5. Patient will be able to tolerate yard work and gardening without an increase in her low back or hip pain. (patient specific functional goal)       Progression Towards Functional goals:  [] Patient is progressing as expected towards functional goals listed. [] Progression is slowed due to complexities listed. [] Progression has been slowed due to co-morbidities.   [x] Plan just implemented, too soon to assess goals progression  [] Other:     ASSESSMENT:  See eval    Treatment/Activity Tolerance:  [x] Patient tolerated treatment well [] Patient limited by fatique  [] Patient limited by pain  [] Patient limited by other medical complications  [] Other:     Prognosis: [x] Good [] Fair  [] Poor    Patient Requires Follow-up: [x] Yes  [] No    PLAN: See eval  [] Continue per plan of care [] Alter current plan (see comments)  [x] Plan of care initiated [] Hold pending MD visit [] Discharge    Electronically signed by: Tiffanie Nascimento PT, DPT

## 2019-05-01 ENCOUNTER — HOSPITAL ENCOUNTER (OUTPATIENT)
Dept: PHYSICAL THERAPY | Age: 54
Setting detail: THERAPIES SERIES
Discharge: HOME | End: 2019-05-01
Payer: COMMERCIAL

## 2019-05-01 NOTE — FLOWSHEET NOTE
[facility] [address] [facility] [address]    Physical Therapy  Cancellation/No-show Note  Patient Name:  Phani Bean  :  1965   Date:  2019  Cancelled visits to date: 0  No-shows to date: 1    For today's appointment patient:  []  Cancelled  []  Rescheduled appointment  [x]  No-show     Reason given by patient:  []  Patient ill  []  Conflicting appointment   []  No transportation    []  Conflict with work  [x]  No reason given  []  Other:     Comments:      Electronically signed by:  Alistair Gamez PT, DPT

## 2019-05-03 ENCOUNTER — HOSPITAL ENCOUNTER (OUTPATIENT)
Dept: PHYSICAL THERAPY | Age: 54
Setting detail: THERAPIES SERIES
Discharge: HOME | End: 2019-05-03

## 2019-05-03 NOTE — FLOWSHEET NOTE
[facility] [address] [facility] [address]    Physical Therapy  Cancellation/No-show Note  Patient Name:  Cristi Melchor  :  1965   Date:  5/3/2019  Cancelled visits to date: 0  No-shows to date: 2    For today's appointment patient:  []  Cancelled  []  Rescheduled appointment  [x]  No-show     Reason given by patient:  []  Patient ill  []  Conflicting appointment   []  No transportation    []  Conflict with work  [x]  No reason given  []  Other:     Comments:      Electronically signed by:  Stephanie Benjamin, PT, DPT

## 2019-05-07 ENCOUNTER — HOSPITAL ENCOUNTER (OUTPATIENT)
Dept: PHYSICAL THERAPY | Age: 54
Setting detail: THERAPIES SERIES
Discharge: HOME | End: 2019-05-07

## 2019-05-07 NOTE — FLOWSHEET NOTE
[facility] [address]    Physical Therapy  Cancellation/No-show Note  Patient Name:  Jocy Joseph  :  1965   Date:  2019  Cancelled visits to date: 1  No-shows to date: 2    For today's appointment patient:  [x]  Cancelled  []  Rescheduled appointment  []  No-show     Reason given by patient:  []  Patient ill  [x]  Conflicting appointment   []  No transportation    []  Conflict with work  []  No reason given  []  Other:     Comments:      Electronically signed by:  Michel Manuel PT, DPT

## 2019-10-30 ENCOUNTER — HOSPITAL ENCOUNTER (OUTPATIENT)
Dept: MAMMOGRAPHY | Age: 54
Discharge: HOME | End: 2019-10-30
Payer: COMMERCIAL

## 2019-10-30 DIAGNOSIS — Z12.31 VISIT FOR SCREENING MAMMOGRAM: ICD-10-CM

## 2019-10-30 PROCEDURE — 77063 BREAST TOMOSYNTHESIS BI: CPT

## 2020-06-09 ENCOUNTER — OFFICE VISIT (OUTPATIENT)
Dept: DERMATOLOGY | Age: 55
End: 2020-06-09
Payer: COMMERCIAL

## 2020-06-09 ENCOUNTER — HOSPITAL ENCOUNTER (OUTPATIENT)
Dept: OCCUPATIONAL THERAPY | Age: 55
Setting detail: THERAPIES SERIES
Discharge: HOME | End: 2020-06-09
Payer: COMMERCIAL

## 2020-06-09 VITALS — TEMPERATURE: 97.9 F

## 2020-06-09 PROCEDURE — 99213 OFFICE O/P EST LOW 20 MIN: CPT | Performed by: DERMATOLOGY

## 2020-06-09 NOTE — PROGRESS NOTES
[facility] Dermat[facility]  Maureen Lynch MD  [phone]      Delon  1965    63 y.o. female     Date of Visit: 6/9/2020    Chief Complaint: moles/lesions, f/u acne  Chief Complaint   Patient presents with    Skin Exam     Last seen: 4-2019  *[place] a [place]  *youngest children just graduated from [address] [place] [address]    History of Present Illness:    1. Hx of AKs. Here for f/u and full skin check. No new concerns. 2. Here for evaluation of multiple asx pigmented lesions on the trunk and extremities, present for many years; no change in size/shape/color of any lesions; no bleeding lesions. 3. S/p excision of DF on the L arm in 2019 - no probs since. 4. She has a dilated blood vessel on the L cheek that she would like removed. Asx.     5.  Hx of facial breakouts - she has had perioral derm, acne (hormonal flares) and fx of rosacea. No c/o currently. S/p ILK for nodules in the past.  Using tretinoin. clinda topically in the past as well. Previously using \"radha's choice\" products. She has had botox and  the past.    Other Hx: HA's x several years. Her HA's resolved completely when she received botox for cosmetic reasons in the past. She reports that she has previously tried imitrex and OTC pain relievers for her HA's in the past without sustained improvement. Dermatology History   Rosacea  has had VI Peels, botox    Several year hx of waxing/waning pruritic eruption involving shoulders/UE and buttocks in the past; diagnosed with gluten sensitivity and has been following gluten-free diet (though occasionally goes off diet); no flares with sun exposure; washes with cetaphil; uses la juana on face and variable moisturizers on remainder of skin. Pt. has been using ice applied to the affected areas to calm the itching.    She had a bx done in the past by Dr. Ferdinand Mendoza bx'd in the past and reports that Dr. Ferdinand Mendoza was concerned about lupus but further w/u was

## 2020-06-09 NOTE — FLOWSHEET NOTE
[facility]    Occupational Therapy  Cancellation/No-show Note  Patient Name:  Paula Torres  :  1965   Date:  2020  Cancelled visits to date: 0  No-shows to date:1 for evaluation    For today's appointment patient:  []  Cancelled  []  Rescheduled appointment  [x]  No-show     Reason given by patient:  []  Patient ill  []  Conflicting appointment  []  No transportation    []  Conflict with work  []  No reason given  []  Other:     Comments:      Phone call information:   []  Phone call made today to patient at 8:30 am_ time at number provided:      []  Patient answered, conversation as follows:    [x]  Patient did not answer, message left as follows: Informed pt we had  Her scheduled today at 8 am and to call and let us know if she wishes to reschedule. []  Phone call not made today  []  Phone call not needed - pt contacted us to cancel and provided reason for cancellation. Electronically signed by:   [id number] [facility], [address] [address] [place] [address]

## 2020-10-06 ENCOUNTER — PROCEDURE VISIT (OUTPATIENT)
Dept: DERMATOLOGY | Age: 55
End: 2020-10-06

## 2020-10-06 VITALS — TEMPERATURE: 97.9 F

## 2020-10-06 PROCEDURE — DM01310 VBEAM LASER SMALL OR 2 AREAS: Performed by: DERMATOLOGY

## 2020-10-06 RX ORDER — BUPROPION HYDROCHLORIDE 75 MG/1
75 TABLET ORAL DAILY
COMMUNITY

## 2020-10-06 NOTE — PROGRESS NOTES
[facility] Dermatology  Cheryl Morales MD  [address] [facility]  1965    63 y.o. female     Date of Visit: 10/6/2020    Chief Complaint: telangiectasias  Chief Complaint   Patient presents with    Laser Treatment     V-beam,-Rt cheek    Other     check a small circular spot on Rt upper back. Last seen: 6-2020  *[place] a house [place]  *youngest children just graduated from [employer] [facility] - 2020    History of Present Illness:    Here for trx of a dilated blood vessel on the L cheek that she would like removed. Asx. Few other tiny telangiectasias on the cheeks she would also like treated. She has had botox and  the past.    Other Hx: HA's x several years. Her HA's resolved completely when she received botox for cosmetic reasons in the past. She reports that she has previously tried imitrex and OTC pain relievers for her HA's in the past without sustained improvement. Dermatology History   Rosacea  has had VI Peels, botox    Several year hx of waxing/waning pruritic eruption involving shoulders/UE and buttocks in the past; diagnosed with gluten sensitivity and has been following gluten-free diet (though occasionally goes off diet); no flares with sun exposure; washes with cetaphil; uses la juana on face and variable moisturizers on remainder of skin. Pt. has been using ice applied to the affected areas to calm the itching. She had a bx done in the past by Dr. Phi Bustillo bx'd in the past and reports that Dr. Phi Bustillo was concerned about lupus but further w/u was negative. Has not recurred since following gluten-free. Review of Systems:  Gen: Feels well, good sense of health. Skin: no changing moles or concerning lesions    Past Medical History, Family History, Surgical History, Medications and Allergies reviewed.     Past Medical History:   Diagnosis Date    Acne     Anxiety     Constipation     Gluten intolerance     Hearing loss     Hormone replacement therapy     Insomnia     can't confirm re sleep apnea    Menopausal syndrome on hormone replacement therapy     Migraine headache     Nausea & vomiting     itching    Ovarian cyst, right     Overweight (BMI 25.0-29. 9)     Pes cavus     Piriformis syndrome     Pruritus     Thyroid disease     nodule    Vitamin D deficiency     started replacement       Past Surgical History:   Procedure Laterality Date    ABDOMEN SURGERY N/A 1/17/2014    ROBOTIC ASSISTED LAPAROSCOPIC REMOVAL OF INFLAMMATORY CYST    ANTERIOR CRUCIATE LIGAMENT REPAIR      left    APPENDECTOMY      BREAST SURGERY      augmentation    ELBOW SURGERY      HERNIA REPAIR      umbilical    HYSTERECTOMY      HYSTERECTOMY  2008    INCONTINENCE SURGERY  2008    MYOMECTOMY      OVARIAN CYST REMOVAL      OVARIAN CYST SURGERY      THYROID SURGERY  biopsy       Outpatient Medications Marked as Taking for the 10/6/20 encounter (Procedure visit) with Gerardo Esteves MD   Medication Sig Dispense Refill    buPROPion (WELLBUTRIN) 75 MG tablet Take 75 mg by mouth daily      tretinoin (RETIN-A) 0.025 % cream APPLY EXTERNALLY TO FACE AT BEDTIME 45 g 2    AMBIEN 10 MG tablet          Allergies   Allergen Reactions    Pcn [Penicillins] Hives    Gluten Meal Other (See Comments)     bloating         Physical Examination     Well-appearing    L cheek with most prominent but both cheeks, ala with scattered telangiectasia      Assessment and Plan     1. Facial tlangiectasia - Vbean    Laser Procedure Note       Radha Yeh   YOB: 1965    DATE OF VISIT:  10/6/2020  Chief Complaint   Patient presents with    Laser Treatment     V-beam,-Rt cheek    Other     check a small circular spot on Rt upper back.          LASER: Vbeam  DIAGNOSIS: telangiectasias            We discussed treatment options, including no treatment as well as the following:  - need for multiple treatments and risk of incomplete clearance, recurrence  -

## 2020-10-29 ENCOUNTER — HOSPITAL ENCOUNTER (OUTPATIENT)
Dept: MAMMOGRAPHY | Age: 55
Discharge: HOME | End: 2020-10-29
Payer: COMMERCIAL

## 2020-10-29 PROCEDURE — 77063 BREAST TOMOSYNTHESIS BI: CPT

## 2021-03-04 ENCOUNTER — HOSPITAL ENCOUNTER (OUTPATIENT)
Dept: GENERAL RADIOLOGY | Age: 56
Discharge: HOME | End: 2021-03-04
Payer: COMMERCIAL

## 2021-03-04 DIAGNOSIS — R14.0 ABDOMINAL BLOATING: ICD-10-CM

## 2021-03-04 PROCEDURE — 74248 X-RAY SM INT F-THRU STD: CPT

## 2021-12-06 ENCOUNTER — HOSPITAL ENCOUNTER (OUTPATIENT)
Dept: WOMENS IMAGING | Age: 56
Discharge: HOME | End: 2021-12-06
Payer: COMMERCIAL

## 2021-12-06 DIAGNOSIS — Z12.31 VISIT FOR SCREENING MAMMOGRAM: ICD-10-CM

## 2021-12-06 PROCEDURE — 77063 BREAST TOMOSYNTHESIS BI: CPT

## 2022-10-04 ENCOUNTER — OFFICE VISIT (OUTPATIENT)
Dept: DERMATOLOGY | Age: 57
End: 2022-10-04
Payer: COMMERCIAL

## 2022-10-04 DIAGNOSIS — L57.0 AK (ACTINIC KERATOSIS): Primary | ICD-10-CM

## 2022-10-04 DIAGNOSIS — D22.9 MULTIPLE NEVI: ICD-10-CM

## 2022-10-04 DIAGNOSIS — I78.1 TELANGIECTASIA: ICD-10-CM

## 2022-10-04 DIAGNOSIS — L70.0 ACNE VULGARIS: ICD-10-CM

## 2022-10-04 DIAGNOSIS — L81.4 LENTIGINES: ICD-10-CM

## 2022-10-04 PROCEDURE — 99214 OFFICE O/P EST MOD 30 MIN: CPT | Performed by: DERMATOLOGY

## 2022-10-04 NOTE — PROGRESS NOTES
[facility] Dermat[facility]  Leonie Sheppard MD  [phone]      Delon  1965    61 y.o. female     Date of Visit: 10/4/2022    Chief Complaint: moles/lesions, f/u acne  Chief Complaint   Patient presents with    Skin Exam     FSE,  various spots           HX:AK  Recheck spot left cheek      Last seen: 2020  *built a house in [facility] 83youngest children graduated from [address] [facility]    History of Present Illness:    1. Hx of AKs. Here for f/u and full skin check. No new concerns. 2. Here for evaluation of multiple asx pigmented lesions on the trunk and extremities, present for many years; no change in size/shape/color of any lesions; no bleeding lesions. 3. S/p excision of DF on the L arm in 2019 - no probs since. 4. S/p Vbeam for dilated blood vessel on the L cheek in 2020. Has a focal area near here remaining - tan and telangiectasia. 5. Hx of facial breakouts - she has had perioral derm, acne (hormonal flares) and fx of rosacea. Would like to cont tretinoin. Overall well-controlled. No c/o currently. S/p ILK for nodules in the past.  Using tretinoin. clinda topically in the past as well. Previously using \"radha's choice\" products. She has had botox and  the past.    Other Hx: HA's x several years. Her HA's resolved completely when she received botox for cosmetic reasons in the past. She reports that she has previously tried imitrex and OTC pain relievers for her HA's in the past without sustained improvement. Dermatology History   Rosacea  has had VI Peels, botox    Several year hx of waxing/waning pruritic eruption involving shoulders/UE and buttocks in the past; diagnosed with gluten sensitivity and has been following gluten-free diet (though occasionally goes off diet); no flares with sun exposure; washes with cetaphil; uses la juana on face and variable moisturizers on remainder of skin.  Pt. has been using ice applied to the affected areas to calm the itching. She had a bx done in the past by Dr. Smiran Ramos bx'd in the past and reports that Dr. Simran Ramos was concerned about lupus but further w/u was negative. Has not recurred since following gluten-free. Review of Systems:  Gen: Feels well, good sense of health. Skin: no changing moles or concerning lesions    Past Medical History, Family History, Surgical History, Medications and Allergies reviewed. Past Medical History:   Diagnosis Date    Acne     Anxiety     Constipation     Gluten intolerance     Hearing loss     Hormone replacement therapy     Insomnia     can't confirm re sleep apnea    Menopausal syndrome on hormone replacement therapy     Migraine headache     Nausea & vomiting     itching    Ovarian cyst, right     Overweight (BMI 25.0-29. 9)     Pes cavus     Piriformis syndrome     Pruritus     Thyroid disease     nodule    Vitamin D deficiency     started replacement       Past Surgical History:   Procedure Laterality Date    ABDOMEN SURGERY N/A 1/17/2014    ROBOTIC ASSISTED LAPAROSCOPIC REMOVAL OF INFLAMMATORY CYST    ANTERIOR CRUCIATE LIGAMENT REPAIR      left    APPENDECTOMY      BREAST SURGERY      augmentation    ELBOW SURGERY      HERNIA REPAIR      umbilical    HYSTERECTOMY (CERVIX STATUS UNKNOWN)      HYSTERECTOMY (CERVIX STATUS UNKNOWN)  2008    INCONTINENCE SURGERY  2008    MYOMECTOMY      OVARIAN CYST REMOVAL      OVARIAN CYST SURGERY      OVARY REMOVAL      THYROID SURGERY  biopsy       Outpatient Medications Marked as Taking for the 10/4/22 encounter (Office Visit) with Morgan Yo MD   Medication Sig Dispense Refill    buPROPion (WELLBUTRIN) 75 MG tablet Take 75 mg by mouth daily      tretinoin (RETIN-A) 0.025 % cream APPLY EXTERNALLY TO FACE AT BEDTIME 45 g 2    AMBIEN 10 MG tablet          Allergies   Allergen Reactions    Pcn [Penicillins] Hives    Gluten Meal Other (See Comments)     bloating         Physical Examination     FSE today    No AK's  trunk and extremities with scattered brown macules and papules   L cheek with faint telangiectasia and tan macule  No acne today    Assessment and Plan     1. Hx of AK, clear today  2. Benign-appearing nevi and lentigines  - Monitor for ABCD's of MM and si/sx of NMSC  Continue sun protection - OTC sunscreen with SPF 30-50+ recommended and reviewed usage  Encouraged skin check yearly (sooner if indicated), self checks    3. Hx DF - excised    4. L cheek - lentigo/telangiectasia - can rpt Vbeam in the fall/winter + LN2 or GL for lentigo    5. Mild acne - stable  6b. perioral dermatitis - stable  - cont tretinoin 0.025% cr qhs to face; educ irrit and photosens, no preg   - clinda pads daily - bid prn worsening    F/u 12 mos for FSE.

## 2023-01-25 ENCOUNTER — HOSPITAL ENCOUNTER (OUTPATIENT)
Dept: MAMMOGRAPHY | Age: 58
Discharge: HOME | End: 2023-01-25
Payer: COMMERCIAL

## 2023-01-25 DIAGNOSIS — Z12.31 VISIT FOR SCREENING MAMMOGRAM: ICD-10-CM

## 2023-01-25 PROCEDURE — 77063 BREAST TOMOSYNTHESIS BI: CPT

## 2023-08-04 ENCOUNTER — TELEPHONE (OUTPATIENT)
Dept: DERMATOLOGY | Age: 58
End: 2023-08-04

## 2023-08-14 ENCOUNTER — OFFICE VISIT (OUTPATIENT)
Dept: DERMATOLOGY | Age: 58
End: 2023-08-14
Payer: COMMERCIAL

## 2023-08-14 ENCOUNTER — TELEPHONE (OUTPATIENT)
Dept: DERMATOLOGY | Age: 58
End: 2023-08-14

## 2023-08-14 DIAGNOSIS — L70.0 ACNE VULGARIS: Primary | ICD-10-CM

## 2023-08-14 PROCEDURE — 99214 OFFICE O/P EST MOD 30 MIN: CPT | Performed by: DERMATOLOGY

## 2023-08-14 RX ORDER — TRETINOIN 0.5 MG/G
CREAM TOPICAL
Qty: 20 G | Refills: 4 | Status: SHIPPED | OUTPATIENT
Start: 2023-08-14

## 2023-08-14 RX ORDER — MINOCYCLINE HYDROCHLORIDE 50 MG/1
CAPSULE ORAL
Qty: 60 CAPSULE | Refills: 0 | Status: SHIPPED | OUTPATIENT
Start: 2023-08-14

## 2023-08-14 NOTE — PROGRESS NOTES
[facility]  Kari Hinson MD  [address] [facility]  1965    60 y.o. female     Date of Visit: 8/14/2023    Chief Complaint: f/u acne  Chief Complaint   Patient presents with    Acne     Recent flare-up- see pt photo     Last seen:   *built a house in [place]  *youngest children graduated from [address] [place] [address]    *grandchild born last week at 32 weeks - 2023*    History of Present Illness:    1. C/o recent facial breakouts/acne flaring. Somewhat better since she made the appt but had multiple papules on the cheeks, chin. Using tretinoin 0.025 cr w/o irritation. Would like to increase for lower face. she has had perioral derm, acne (hormonal flares) and fx of rosacea. S/p ILK for nodules in the past.  Using tretinoin. clinda topically in the past as well. Previously using \"radha's choice\" products. S/p excision of DF on the L arm in 2019 - no probs since. S/p Vbeam for dilated blood vessel on the L cheek in 2020. Has a focal area near here remaining - tan and telangiectasia. She has had botox and  the past.    Other Hx: HA's x several years. Her HA's resolved completely when she received botox for cosmetic reasons in the past. She reports that she has previously tried imitrex and OTC pain relievers for her HA's in the past without sustained improvement. Dermatology History   Rosacea  has had VI Peels, botox    Several year hx of waxing/waning pruritic eruption involving shoulders/UE and buttocks in the past; diagnosed with gluten sensitivity and has been following gluten-free diet (though occasionally goes off diet); no flares with sun exposure; washes with cetaphil; uses la juana on face and variable moisturizers on remainder of skin. Pt. has been using ice applied to the affected areas to calm the itching.    She had a bx done in the past by Dr. Anjana Alvarado bx'd in the past and reports that Dr. Anjana Alvarado was concerned about lupus but

## 2023-08-14 NOTE — TELEPHONE ENCOUNTER
ICD 10 L70.9 and there should be two PA's for this patient, due to two medications with different dosages:     tretinoin (RETIN-A) 0.05 % cream and    tretinoin (RETIN-A) 0.025 % cream

## 2023-08-14 NOTE — TELEPHONE ENCOUNTER
Received PA for tretinoin. Can you please provide an ICD-10 code? Thanks! If this requires a response please respond to the pool ( [phone]). Thank you please advise patient.

## 2023-08-15 ENCOUNTER — TELEPHONE (OUTPATIENT)
Dept: DERMATOLOGY | Age: 58
End: 2023-08-15

## 2023-08-15 NOTE — TELEPHONE ENCOUNTER
Submitted PA for Tretinoin 0.05% cream  [facility] Key: [id number]   STATUS: Approved today  PA Case: [id number], Status: Approved, Coverage Starts on: 8/15/2023 12:00:00 AM, Coverage Ends on: 8/14/2025      Submitted PA for Tretinoin 0.025% cream  [facility] Key: [id number]   STATUS: Authorization already on file for this request. Authorization starting on 08/15/2023 and ending on 08/14/2025.

## 2023-08-15 NOTE — TELEPHONE ENCOUNTER
Pt calling states her pharm told her she may need a PA from her insurance on medication Tretinoin 0.05% and 0.05% cream pls call pharm or reach back out to patient

## 2023-08-16 NOTE — TELEPHONE ENCOUNTER
Called [facility] to confirm prior authorizations for both strengths of tretinoin are approved and can be filled. LVM for patient-call [facility] for prescription status.

## 2023-12-28 ENCOUNTER — HOSPITAL ENCOUNTER (OUTPATIENT)
Dept: GENERAL RADIOLOGY | Age: 58
Discharge: HOME | End: 2023-12-28
Payer: COMMERCIAL

## 2023-12-28 DIAGNOSIS — M19.011 ARTHRITIS OF RIGHT ACROMIOCLAVICULAR JOINT: ICD-10-CM

## 2023-12-28 PROCEDURE — 73030 X-RAY EXAM OF SHOULDER: CPT

## 2024-03-25 ENCOUNTER — TELEPHONE (OUTPATIENT)
Dept: DERMATOLOGY | Age: 59
End: 2024-03-25

## 2024-03-25 NOTE — TELEPHONE ENCOUNTER
Pt has a couple of spots on her leg she had to cancel recent appt because she had COVID would like an appt soon  [phone]

## 2024-04-22 ENCOUNTER — TELEPHONE (OUTPATIENT)
Dept: DERMATOLOGY | Age: 59
End: 2024-04-22

## 2024-04-22 NOTE — TELEPHONE ENCOUNTER
Rash on face, and pt doesn't have appt until May. States has PCP and allergist have both offered treatments but rash is still spreading.    [phone]

## 2024-04-22 NOTE — TELEPHONE ENCOUNTER
Patient scheduled for rash on face X 1 month (looks like diaper rash on face).     PCP prescribed Ketoconazole w/out relief.  Last week allergist prescribed mupirocin and eucrisa w/out relief.

## 2024-04-23 ENCOUNTER — OFFICE VISIT (OUTPATIENT)
Dept: DERMATOLOGY | Age: 59
End: 2024-04-23
Payer: COMMERCIAL

## 2024-04-23 DIAGNOSIS — L65.9 HAIR LOSS: Primary | ICD-10-CM

## 2024-04-23 DIAGNOSIS — L71.0 PERIORAL DERMATITIS: ICD-10-CM

## 2024-04-23 DIAGNOSIS — L70.0 ACNE VULGARIS: ICD-10-CM

## 2024-04-23 DIAGNOSIS — R53.83 OTHER FATIGUE: ICD-10-CM

## 2024-04-23 PROCEDURE — 99214 OFFICE O/P EST MOD 30 MIN: CPT | Performed by: DERMATOLOGY

## 2024-04-23 RX ORDER — MINOCYCLINE HYDROCHLORIDE 50 MG/1
CAPSULE ORAL
Qty: 60 CAPSULE | Refills: 1 | Status: SHIPPED | OUTPATIENT
Start: 2024-04-23

## 2024-04-23 NOTE — PROGRESS NOTES
[facility] Dermatology  Gabriela Sommer MD  [phone]      Radha Yeh  1965    58 y.o. female     Date of Visit: 4/23/2024    Chief Complaint: rash, f/u acne, hair loss  Chief Complaint   Patient presents with    Rash     Face x 1 month PCP ketoconazole and allergist mupirocin and eucrisa-no relief     Last seen: 8-2023  *built a house in [place] - sold in 2024  *youngest children graduated from  - 2020    *grandchild born at 27 weeks - 2023*    History of Present Illness:    1. Facial eruption x 1 month. Around the nose and mouth.  Is very itchy.  Has tried ketocon cream, mupirocin and eucrisa w/o improvement.    2. C/o other facial breakouts/acne flaring.    Mainly lower face/jawline - deeper lesions that flare with estradiol vaginal insert use.  Has used tretinoin 0.025 cr w/o irritation.      3. C/o hair loss/thinning over the past several mos.  No trx tried.  ++ stress - sold house, living in [place] until finds house to buy.  No medication changes.  No surgeries.    S/p ILK for nodules in the past.  Using tretinoin.  clinda topically in the past as well.  Previously using \"radha's choice\" products.    S/p excision of DF on the L arm in 2019 - no probs since.  S/p Vbeam for dilated blood vessel on the L cheek in 2020.  Has a focal area near here remaining - tan and telangiectasia.      She has had botox and  the past.    Other Hx: HA's x several years. Her HA's resolved completely when she received botox for cosmetic reasons in the past. She reports that she has previously tried imitrex and OTC pain relievers for her HA's in the past without sustained improvement.    Dermatology History   Rosacea  has had VI Peels, botox    Several year hx of waxing/waning pruritic eruption involving shoulders/UE and buttocks in the past; diagnosed with gluten sensitivity and has been following gluten-free diet (though occasionally goes off diet); no flares with sun exposure;

## 2024-04-24 ENCOUNTER — HOSPITAL ENCOUNTER (OUTPATIENT)
Dept: MAMMOGRAPHY | Age: 59
Discharge: HOME | End: 2024-04-24
Payer: COMMERCIAL

## 2024-04-24 VITALS — WEIGHT: 164 LBS | HEIGHT: 67 IN | BODY MASS INDEX: 25.74 KG/M2

## 2024-04-24 DIAGNOSIS — Z12.31 VISIT FOR SCREENING MAMMOGRAM: ICD-10-CM

## 2024-04-24 PROCEDURE — 77063 BREAST TOMOSYNTHESIS BI: CPT

## 2024-05-01 ENCOUNTER — HOSPITAL ENCOUNTER (OUTPATIENT)
Dept: WOMENS IMAGING | Age: 59
Discharge: HOME | End: 2024-05-01
Payer: COMMERCIAL

## 2024-05-01 ENCOUNTER — HOSPITAL ENCOUNTER (OUTPATIENT)
Dept: ULTRASOUND IMAGING | Age: 59
Discharge: HOME | End: 2024-05-01
Payer: COMMERCIAL

## 2024-05-01 DIAGNOSIS — R92.2 INCONCLUSIVE MAMMOGRAM: ICD-10-CM

## 2024-05-01 PROCEDURE — 77065 DX MAMMO INCL CAD UNI: CPT

## 2024-05-08 ENCOUNTER — TELEPHONE (OUTPATIENT)
Dept: DERMATOLOGY | Age: 59
End: 2024-05-08

## 2024-05-08 ENCOUNTER — HOSPITAL ENCOUNTER (OUTPATIENT)
Dept: MAMMOGRAPHY | Age: 59
Discharge: HOME | End: 2024-05-08
Payer: COMMERCIAL

## 2024-05-08 DIAGNOSIS — R92.8 ABNORMAL MAMMOGRAM: ICD-10-CM

## 2024-05-08 PROCEDURE — 88305 TISSUE EXAM BY PATHOLOGIST: CPT

## 2024-05-08 PROCEDURE — 19081 BX BREAST 1ST LESION STRTCTC: CPT

## 2024-05-08 PROCEDURE — 88341 IMHCHEM/IMCYTCHM EA ADD ANTB: CPT

## 2024-05-08 PROCEDURE — 2720000010 MAM STEREO BREAST BX W LOC DEVICE 1ST LESION RIGHT

## 2024-05-08 PROCEDURE — 88342 IMHCHEM/IMCYTCHM 1ST ANTB: CPT

## 2024-05-16 ENCOUNTER — OFFICE VISIT (OUTPATIENT)
Dept: DERMATOLOGY | Age: 59
End: 2024-05-16

## 2024-05-16 DIAGNOSIS — L70.0 ACNE VULGARIS: ICD-10-CM

## 2024-05-16 DIAGNOSIS — L82.1 SEBORRHEIC KERATOSIS: ICD-10-CM

## 2024-05-16 DIAGNOSIS — L57.0 AK (ACTINIC KERATOSIS): ICD-10-CM

## 2024-05-16 DIAGNOSIS — L65.9 HAIR LOSS: ICD-10-CM

## 2024-05-16 DIAGNOSIS — D22.9 MULTIPLE NEVI: ICD-10-CM

## 2024-05-16 DIAGNOSIS — L71.0 PERIORAL DERMATITIS: ICD-10-CM

## 2024-05-16 DIAGNOSIS — L81.4 LENTIGINES: ICD-10-CM

## 2024-05-16 DIAGNOSIS — Z51.81 MEDICATION MONITORING ENCOUNTER: Primary | ICD-10-CM

## 2024-05-16 DIAGNOSIS — R79.89 ELEVATED FERRITIN: ICD-10-CM

## 2024-05-16 RX ORDER — SPIRONOLACTONE 50 MG/1
50 TABLET, FILM COATED ORAL 2 TIMES DAILY
Qty: 60 TABLET | Refills: 3 | Status: SHIPPED | OUTPATIENT
Start: 2024-05-16 | End: 2024-05-17

## 2024-05-16 NOTE — PROGRESS NOTES
Chief Complaints and History of Present Illnesses   Patient presents with     Follow Up For     possible suture removal     HPI    Affected eye(s):  Left   Symptoms:        Duration:  2 months   Frequency:  Constant       Do you have eye pain now?:  No      Comments:  States va is the same since last visit  +red and irritation occ more when at the gym in the [facility]  [place] COT 7:25 AM January 3, 2017                   [facility] Dermatology  Gabriela Sommer MD  [phone]      Radha Yeh  1965    58 y.o. female     Date of Visit: 5/16/2024    Chief Complaint: moles/lesions, f/u acne  Chief Complaint   Patient presents with    Skin Lesion     On left leg     Skin Exam     Last seen: 4-2024  *built a house in [facility]  *youngest children graduated from HS - 2020    History of Present Illness:    1. Hx of AKs.  Here for f/u and full skin check.  No new concerns.    2. Here for evaluation of multiple asx pigmented lesions on the trunk and extremities, present for many years; no change in size/shape/color of any lesions; no bleeding lesions.  She notes a spot on the L leg to be checked.  Asx.     3. S/p excision of DF on the L arm in 2019 - no probs since.    4. S/p Vbeam for dilated blood vessel on the L cheek in 2020.      5. Hx of facial breakouts - she has had perioral derm, acne (hormonal flares) and fx of rosacea.   Seen a few weeks ago for flaring perioral dermatitis - better with sasha + metrocream since last seen.  Tolerating well.    Has more of a hormonal distribution of more traditional acne lesions  - lower face.  Would like to proceed with spirono now that perioral derm is better.    No c/o currently.  S/p ILK for nodules in the past.  Using tretinoin.  clinda topically in the past as well.  Previously using \"radha's choice\" products.    6. C/o hair loss + fatigue - chronic and hair worsening over the past few years.  No trx tried.    7. Recently elevated ferritin - 250 - with checking labs for hair loss.  Notes that she has a cousin with hemochromatosis.  No other previous w/u.    She has had botox and  the past.    Other Hx: HA's x several years. Her HA's resolved completely when she received botox for cosmetic reasons in the past. She reports that she has previously tried imitrex and OTC pain relievers for her HA's in the past without sustained improvement.    Dermatology

## 2024-05-17 RX ORDER — SPIRONOLACTONE 50 MG/1
50 TABLET, FILM COATED ORAL 2 TIMES DAILY
Qty: 180 TABLET | Refills: 0 | Status: SHIPPED | OUTPATIENT
Start: 2024-05-17

## 2024-08-21 DIAGNOSIS — Z51.81 MEDICATION MONITORING ENCOUNTER: Primary | ICD-10-CM

## 2024-08-21 RX ORDER — SPIRONOLACTONE 50 MG/1
50 TABLET, FILM COATED ORAL 2 TIMES DAILY
Qty: 180 TABLET | Refills: 0 | Status: SHIPPED | OUTPATIENT
Start: 2024-08-21

## 2024-08-22 NOTE — TELEPHONE ENCOUNTER
Refill sent.    Will you please put in a new renal panel order and let her know to get it done?  WHen she had her labs done in May, they couldn't run the potassium with the specimen that was drawn so I want to make sure that is still fine with being on the medication.

## 2024-12-20 RX ORDER — SPIRONOLACTONE 50 MG/1
50 TABLET, FILM COATED ORAL 2 TIMES DAILY
Qty: 180 TABLET | Refills: 0 | OUTPATIENT
Start: 2024-12-20

## 2025-02-04 ENCOUNTER — ANESTHESIA (OUTPATIENT)
Dept: OPERATING ROOM | Age: 60
End: 2025-02-04
Payer: COMMERCIAL

## 2025-02-04 ENCOUNTER — ANESTHESIA EVENT (OUTPATIENT)
Dept: OPERATING ROOM | Age: 60
End: 2025-02-04
Payer: COMMERCIAL

## 2025-02-04 PROCEDURE — 2500000003 HC RX 250 WO HCPCS: Performed by: STUDENT IN AN ORGANIZED HEALTH CARE EDUCATION/TRAINING PROGRAM

## 2025-02-04 PROCEDURE — 2580000003 HC RX 258

## 2025-02-04 PROCEDURE — 2500000003 HC RX 250 WO HCPCS

## 2025-02-04 PROCEDURE — 2500000003 HC RX 250 WO HCPCS: Performed by: ANESTHESIOLOGY

## 2025-02-04 PROCEDURE — 6360000002 HC RX W HCPCS: Performed by: STUDENT IN AN ORGANIZED HEALTH CARE EDUCATION/TRAINING PROGRAM

## 2025-02-04 PROCEDURE — 6360000002 HC RX W HCPCS

## 2025-02-04 RX ORDER — HYDROMORPHONE HYDROCHLORIDE 2 MG/ML
INJECTION, SOLUTION INTRAMUSCULAR; INTRAVENOUS; SUBCUTANEOUS
Status: DISCONTINUED | OUTPATIENT
Start: 2025-02-04 | End: 2025-02-04 | Stop reason: SDUPTHER

## 2025-02-04 RX ORDER — ONDANSETRON 2 MG/ML
INJECTION INTRAMUSCULAR; INTRAVENOUS
Status: DISCONTINUED | OUTPATIENT
Start: 2025-02-04 | End: 2025-02-04 | Stop reason: SDUPTHER

## 2025-02-04 RX ORDER — DEXAMETHASONE SODIUM PHOSPHATE 4 MG/ML
INJECTION, SOLUTION INTRA-ARTICULAR; INTRALESIONAL; INTRAMUSCULAR; INTRAVENOUS; SOFT TISSUE
Status: DISCONTINUED | OUTPATIENT
Start: 2025-02-04 | End: 2025-02-04 | Stop reason: SDUPTHER

## 2025-02-04 RX ORDER — SODIUM CHLORIDE, SODIUM LACTATE, POTASSIUM CHLORIDE, CALCIUM CHLORIDE 600; 310; 30; 20 MG/100ML; MG/100ML; MG/100ML; MG/100ML
INJECTION, SOLUTION INTRAVENOUS
Status: DISCONTINUED | OUTPATIENT
Start: 2025-02-04 | End: 2025-02-04 | Stop reason: SDUPTHER

## 2025-02-04 RX ORDER — FENTANYL CITRATE 50 UG/ML
INJECTION, SOLUTION INTRAMUSCULAR; INTRAVENOUS
Status: DISCONTINUED | OUTPATIENT
Start: 2025-02-04 | End: 2025-02-04 | Stop reason: SDUPTHER

## 2025-02-04 RX ORDER — PROPOFOL 10 MG/ML
INJECTION, EMULSION INTRAVENOUS
Status: DISCONTINUED | OUTPATIENT
Start: 2025-02-04 | End: 2025-02-04 | Stop reason: SDUPTHER

## 2025-02-04 RX ORDER — ESMOLOL HYDROCHLORIDE 10 MG/ML
INJECTION INTRAVENOUS
Status: DISCONTINUED | OUTPATIENT
Start: 2025-02-04 | End: 2025-02-04 | Stop reason: SDUPTHER

## 2025-02-04 RX ORDER — ROCURONIUM BROMIDE 10 MG/ML
INJECTION, SOLUTION INTRAVENOUS
Status: DISCONTINUED | OUTPATIENT
Start: 2025-02-04 | End: 2025-02-04 | Stop reason: SDUPTHER

## 2025-02-04 RX ORDER — LIDOCAINE HYDROCHLORIDE 20 MG/ML
INJECTION, SOLUTION INTRAVENOUS
Status: DISCONTINUED | OUTPATIENT
Start: 2025-02-04 | End: 2025-02-04 | Stop reason: SDUPTHER

## 2025-02-04 RX ORDER — METHOCARBAMOL 100 MG/ML
INJECTION, SOLUTION INTRAMUSCULAR; INTRAVENOUS
Status: DISCONTINUED | OUTPATIENT
Start: 2025-02-04 | End: 2025-02-04 | Stop reason: SDUPTHER

## 2025-02-04 RX ADMIN — ONDANSETRON 4 MG: 2 INJECTION INTRAMUSCULAR; INTRAVENOUS at 17:53

## 2025-02-04 RX ADMIN — LIDOCAINE HYDROCHLORIDE 100 MG: 20 INJECTION, SOLUTION INTRAVENOUS at 17:57

## 2025-02-04 RX ADMIN — HEPARIN SODIUM 5000 UNITS: 5000 INJECTION, SOLUTION INTRAVENOUS; SUBCUTANEOUS at 18:06

## 2025-02-04 RX ADMIN — ESMOLOL HYDROCHLORIDE 30 MG: 10 INJECTION, SOLUTION INTRAVENOUS at 18:55

## 2025-02-04 RX ADMIN — FENTANYL CITRATE 100 MCG: 50 INJECTION, SOLUTION INTRAMUSCULAR; INTRAVENOUS at 18:52

## 2025-02-04 RX ADMIN — ROCURONIUM BROMIDE 20 MG: 10 INJECTION, SOLUTION INTRAVENOUS at 18:46

## 2025-02-04 RX ADMIN — WATER 2000 MG: 1 INJECTION INTRAMUSCULAR; INTRAVENOUS; SUBCUTANEOUS at 18:10

## 2025-02-04 RX ADMIN — ROCURONIUM BROMIDE 50 MG: 10 INJECTION, SOLUTION INTRAVENOUS at 17:57

## 2025-02-04 RX ADMIN — METHOCARBAMOL 1000 MG: 100 INJECTION, SOLUTION INTRAMUSCULAR; INTRAVENOUS at 18:51

## 2025-02-04 RX ADMIN — ESMOLOL HYDROCHLORIDE 20 MG: 10 INJECTION, SOLUTION INTRAVENOUS at 18:02

## 2025-02-04 RX ADMIN — HYDROMORPHONE HYDROCHLORIDE 1 MG: 2 INJECTION, SOLUTION INTRAMUSCULAR; INTRAVENOUS; SUBCUTANEOUS at 17:57

## 2025-02-04 RX ADMIN — HYDROMORPHONE HYDROCHLORIDE 1 MG: 2 INJECTION, SOLUTION INTRAMUSCULAR; INTRAVENOUS; SUBCUTANEOUS at 17:51

## 2025-02-04 RX ADMIN — DEXAMETHASONE SODIUM PHOSPHATE 8 MG: 4 INJECTION INTRA-ARTICULAR; INTRALESIONAL; INTRAMUSCULAR; INTRAVENOUS; SOFT TISSUE at 18:01

## 2025-02-04 RX ADMIN — SUGAMMADEX 200 MG: 100 INJECTION, SOLUTION INTRAVENOUS at 19:41

## 2025-02-04 RX ADMIN — SODIUM CHLORIDE, SODIUM LACTATE, POTASSIUM CHLORIDE, AND CALCIUM CHLORIDE: .6; .31; .03; .02 INJECTION, SOLUTION INTRAVENOUS at 17:51

## 2025-02-04 RX ADMIN — PROPOFOL 200 MG: 10 INJECTION, EMULSION INTRAVENOUS at 17:57

## 2025-02-04 NOTE — ANESTHESIA PRE PROCEDURE
Department of Anesthesiology  Preprocedure Note       Name:  Radha Yeh   Age:  59 y.o.  :  1965                                          MRN:  [id number]         Date:  2025      Surgeon: Surgeon(s):  Edvin Martinez MD    Procedure: Procedure(s):  DIAGNOSTIC LAPAROSCOPY; POSSIBLE LOOP COLOSTOMY; POSSIBLE TRANSANAL REPAIR; POSSIBLE OPEN    Medications prior to admission:   Prior to Admission medications    Medication Sig Start Date End Date Taking? Authorizing Provider   spironolactone (ALDACTONE) 50 MG tablet TAKE 1 TABLET BY MOUTH TWICE DAILY 24   Gabriela Sommer MD   minocycline (MINOCIN) 50 MG capsule One po bid with food. 24   Gabriela Sommer MD   tretinoin (RETIN-A) 0.05 % cream Apply a pea sized amount to the lower face QHS 23   Gabriela Sommer MD   tretinoin (RETIN-A) 0.025 % cream Apply pea-sized amount to upper face at bedtime. 23   Gabriela Sommer MD   minocycline (MINOCIN) 50 MG capsule One po bid if acne flares. 23   Gabriela Sommer MD   buPROPion (WELLBUTRIN) 75 MG tablet Take 1 tablet by mouth daily    Provider, MD Dontrell   AMBMATTHIEU 10 MG tablet  17   Provider, MD Dontrell       Current medications:    Current Facility-Administered Medications   Medication Dose Route Frequency Provider Last Rate Last Admin   • diatrizoate meglumine-sodium (GASTROGRAFIN) 66-10 % solution 30 mL  30 mL Rectal ONCE PRN Gustavo Salinas MD   30 mL at 25 1510   • lactated ringers infusion   IntraVENous Continuous Gustavo Salinas MD 75 mL/hr at 25 1751 NoRateChange at 25 1751   • [START ON 2025] cefTRIAXone (ROCEPHIN) 1,000 mg in sterile water 10 mL IV syringe  1,000 mg IntraVENous Q24H Gustavo Salinas MD       • metroNIDAZOLE (FLAGYL) 500 mg in 0.9% NaCl 100 mL IVPB premix  500 mg IntraVENous Q8H Gustavo Salinas MD       • ceFAZolin (ANCEF) 2,000 mg in sterile water 20 mL IV syringe  2,000 mg IntraVENous On

## 2025-02-05 NOTE — ANESTHESIA POSTPROCEDURE EVALUATION
Department of Anesthesiology  Postprocedure Note    Patient: Radha Yeh  MRN: [id number]  YOB: 1965  Date of evaluation: 2/4/2025    Procedure Summary       Date: 02/04/25 Room / Location: [address]    Anesthesia Start: 1751 Anesthesia Stop: 2001    Procedure: LAPAROSCOPIC DIVERTING LOOP COLOSTOMY; TRANSANAL REPAIR WITH TEMIS TECHNIQUE (Abdomen/Perineum) Diagnosis:       Rectal perforation (HCC)      (Rectal perforation (HCC) [K63.1])    Surgeons: Edvin Martinez MD Responsible Provider: Osei Cody MD    Anesthesia Type: general ASA Status: 2            Anesthesia Type: No value filed.    Lizz Phase I:      Lizz Phase II:      Anesthesia Post Evaluation    Patient location during evaluation: PACU  Patient participation: complete - patient participated  Level of consciousness: awake  Airway patency: patent  Nausea & Vomiting: no nausea and no vomiting  Cardiovascular status: blood pressure returned to baseline and hemodynamically stable  Respiratory status: acceptable  Hydration status: euvolemic  Multimodal analgesia pain management approach  Pain management: adequate    No notable events documented.